# Patient Record
Sex: FEMALE | NOT HISPANIC OR LATINO | URBAN - METROPOLITAN AREA
[De-identification: names, ages, dates, MRNs, and addresses within clinical notes are randomized per-mention and may not be internally consistent; named-entity substitution may affect disease eponyms.]

---

## 2019-04-30 ENCOUNTER — OFFICE VISIT (OUTPATIENT)
Dept: FAMILY MEDICINE CLINIC | Facility: CLINIC | Age: 34
End: 2019-04-30

## 2019-04-30 VITALS
WEIGHT: 212.4 LBS | HEART RATE: 72 BPM | SYSTOLIC BLOOD PRESSURE: 110 MMHG | RESPIRATION RATE: 18 BRPM | HEIGHT: 65 IN | TEMPERATURE: 98.4 F | BODY MASS INDEX: 35.39 KG/M2 | OXYGEN SATURATION: 99 % | DIASTOLIC BLOOD PRESSURE: 78 MMHG

## 2019-04-30 DIAGNOSIS — G44.229 CHRONIC TENSION-TYPE HEADACHE, NOT INTRACTABLE: ICD-10-CM

## 2019-04-30 DIAGNOSIS — Z00.00 ANNUAL PHYSICAL EXAM: Primary | ICD-10-CM

## 2019-04-30 DIAGNOSIS — J02.9 SORE THROAT: ICD-10-CM

## 2019-04-30 DIAGNOSIS — Z31.9 PATIENT DESIRES PREGNANCY: ICD-10-CM

## 2019-04-30 PROCEDURE — 99385 PREV VISIT NEW AGE 18-39: CPT | Performed by: FAMILY MEDICINE

## 2019-04-30 RX ORDER — FLUTICASONE PROPIONATE 50 MCG
SPRAY, SUSPENSION (ML) NASAL
Refills: 0 | COMMUNITY
Start: 2019-03-11 | End: 2019-04-30 | Stop reason: ALTCHOICE

## 2019-04-30 NOTE — PROGRESS NOTES
Chief Complaint   Patient presents with   • Establish Care     NP to [facility]   • Annual Exam       Subjective   Jyotsna Randolph is a 34 y.o. female.     History of Present Illness   She has severe throat pain often. Feels she gets throat infections frequently. She says it is so painful. She gets abx for it. It is the worst in the winter. She has never had her tonsils removed. With the sore throat she gets fever and chills, she requires higher dose of tylenol for the pain.  She does have young child 6 yo but they are never sick when she is. No sore throats. She was seen 1/2018, 4/2018, 6/2018, 12/2018 and 3/2019.    She does follow with her gynecologist. Was last seen there 11/2018  She eats varied diet, no restrictions, she does not exercise regularly.   Father with diabetes otherwise no major medical conditions in her family.     Headaches  Has 2-3 times in a month. Hurts on the top central back of her head. She wants to sleep and has not interest in doing anything. It is throbbing headache. 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 It is relieved by rest and coffee. No nausea or vomiting associated with headache. No aura. She feels like it is stress comes from her job, feels it come up her neck to her head.     The following portions of the patient's history were reviewed and updated as appropriate: allergies, current medications, past family history, past medical history, past social history, past surgical history and problem list.    Review of Systems   Constitutional: Negative for activity change, appetite change and unexpected weight change.   Respiratory: Negative for shortness of breath.    Cardiovascular: Negative for chest pain and leg swelling.   Gastrointestinal: Negative for blood in stool, constipation and diarrhea.       Vitals:    04/30/19 0943   BP: 110/78   BP Location: Left arm   Patient Position: Sitting   Cuff Size: Adult   Pulse: 72   Resp: 18   Temp: 98.4 °F (36.9 °C)   TempSrc: Oral  "  SpO2: 99%   Weight: 96.3 kg (212 lb 6.4 oz)   Height: 165.1 cm (65\")       Objective   Physical Exam   Constitutional: She is oriented to person, place, and time. She appears well-nourished. No distress.   HENT:   Right Ear: External ear normal.   Left Ear: External ear normal.   Nose: Nose normal.   Mouth/Throat: Oropharynx is clear and moist. No oropharyngeal exudate.   Bilateral ear canals and tympanic membranes appear normal.   Eyes: Conjunctivae and EOM are normal. Right eye exhibits no discharge. Left eye exhibits no discharge. No scleral icterus.   Neck: Neck supple. No thyromegaly present.   Cardiovascular: Normal rate, regular rhythm, normal heart sounds and intact distal pulses. Exam reveals no gallop and no friction rub.   No murmur heard.  Pulmonary/Chest: Effort normal and breath sounds normal. No respiratory distress. She has no wheezes. She has no rales. She exhibits no tenderness.   Abdominal: Soft. Bowel sounds are normal. She exhibits no distension and no mass. There is no tenderness. There is no guarding.   Musculoskeletal: She exhibits no edema or deformity.   Lymphadenopathy:     She has no cervical adenopathy.   Neurological: She is alert and oriented to person, place, and time. She exhibits normal muscle tone. Coordination normal.   Skin: Skin is warm and dry.   Psychiatric: She has a normal mood and affect. Her behavior is normal.   Vitals reviewed.      Assessment/Plan   Jyotsna was seen today for establish care and annual exam.    Diagnoses and all orders for this visit:    Annual physical exam    Chronic tension-type headache, not intractable    Sore throat    Patient desires pregnancy    Other orders  -     budesonide (RA BUDESONIDE) 32 MCG/ACT nasal spray; 1 spray into the nostril(s) as directed by provider Daily.      We discussed the importance of regular physical activity.  Cardiovascular activity for the equivalent of 30 minutes 5 days per week.  We also discussed the importance of " healthy diet to avoid weight gain and sugar intolerance.  I recommend predominantly filling the diet with vegetables and lean meats followed by fruits and whole grains.  I also recommend overall avoiding processed foods.  Advised specifically to try yoga and walking for the stress and resultant headaches. Also these are good activities to continue during pregnancy.   She also is not good about hydrating with fluids during the day. Advised drinking more water for her sore throat and headaches. She should carry water bottle. She did go to the  frequently for sore throat. More likely related to allergies. Will change her to budesonide for pregnancy. Advised to take start of winter through start of spring. Instructed on how to administer. She voiced understanding.  Encouraged her to follow up with her OB. She started trying to have pregnancy this month. She should start PNV ASAP. Asking about immune boosting vitamin and encouraged her to take PNV.

## 2023-09-29 ENCOUNTER — HOSPITAL ENCOUNTER (OUTPATIENT)
Dept: GENERAL RADIOLOGY | Facility: HOSPITAL | Age: 38
Discharge: HOME | End: 2023-09-29
Admitting: NURSE PRACTITIONER
Payer: COMMERCIAL

## 2023-09-29 ENCOUNTER — OFFICE VISIT (OUTPATIENT)
Dept: INTERNAL MEDICINE | Facility: CLINIC | Age: 38
End: 2023-09-29
Payer: COMMERCIAL

## 2023-09-29 VITALS
HEART RATE: 61 BPM | SYSTOLIC BLOOD PRESSURE: 100 MMHG | DIASTOLIC BLOOD PRESSURE: 64 MMHG | HEIGHT: 65 IN | WEIGHT: 221.4 LBS | BODY MASS INDEX: 36.89 KG/M2 | TEMPERATURE: 97.8 F | OXYGEN SATURATION: 99 %

## 2023-09-29 DIAGNOSIS — Z00.00 ENCOUNTER FOR ANNUAL PHYSICAL EXAM: Primary | ICD-10-CM

## 2023-09-29 DIAGNOSIS — J06.9 UPPER RESPIRATORY TRACT INFECTION, UNSPECIFIED TYPE: ICD-10-CM

## 2023-09-29 DIAGNOSIS — E66.01 MORBID (SEVERE) OBESITY DUE TO EXCESS CALORIES: ICD-10-CM

## 2023-09-29 DIAGNOSIS — S39.92XD INJURY OF COCCYX, SUBSEQUENT ENCOUNTER: ICD-10-CM

## 2023-09-29 DIAGNOSIS — Z00.00 ENCOUNTER FOR MEDICAL EXAMINATION TO ESTABLISH CARE: ICD-10-CM

## 2023-09-29 DIAGNOSIS — R53.83 OTHER FATIGUE: ICD-10-CM

## 2023-09-29 PROCEDURE — 72220 X-RAY EXAM SACRUM TAILBONE: CPT

## 2023-09-29 RX ORDER — DIPHENOXYLATE HYDROCHLORIDE AND ATROPINE SULFATE 2.5; .025 MG/1; MG/1
1 TABLET ORAL DAILY
Qty: 30 TABLET | Refills: 12 | Status: SHIPPED | OUTPATIENT
Start: 2023-09-29

## 2023-09-29 RX ORDER — AMOXICILLIN AND CLAVULANATE POTASSIUM 875; 125 MG/1; MG/1
1 TABLET, FILM COATED ORAL 2 TIMES DAILY
Qty: 20 TABLET | Refills: 0 | Status: SHIPPED | OUTPATIENT
Start: 2023-09-29 | End: 2023-10-09

## 2023-09-29 NOTE — PROGRESS NOTES
Please contact patient and let her know that the x-ray of the sacrum and coccyx is normal.  Thank you, Darlene Woodard

## 2023-09-29 NOTE — PATIENT INSTRUCTIONS
Please stop at the pharmacy and  your prescriptions and take as directed.  If your symptoms get worse with any shortness of breath or high fever that does not come down with Tylenol or ibuprofen please go to the emergency room.

## 2023-09-29 NOTE — PROGRESS NOTES
"Chief Complaint  Establish care/annual physical exam/URI    Subjective        Jyotsna Randolph presents to [facility]  History of Present Illness    Patient is a pleasant 38-year-old female who is new to me and new to the practice.  Patient lives at home with her  and 2 sons 1 that is 3 and the other is elementary age.  She works from home as a .    Patient is also here with complaints of URI x 3 weeks.  Symptoms include cough/runny nose, fatigue.  Denies any fever or chills at this time.  Patient has been tested for COVID and flu at the [facility] both were negative and she was not treated.  Patient has been taking over-the-counter Mucinex at this time.  Symptoms ongoing she is concerned at this time due to cough and congestion.    History of tailbone (pain). Fall years ago. 2021/2022 (x 2).     Hx Hemorrhoids.    Reports normal bowel and bladder history and normal menstrual cycle.  Patient has not seen OB/GYN since her last birth in 2020.  She will contact their office for her annual Pap smear.      Patient was instructed to go to the pharmacy and  a tailbone cushion at this time with her recent history of injury.  I will also do an x-ray at this time and contact her with those results when they are finalized by the radiologist.    Objective   Vital Signs:  /64   Pulse 61   Temp 97.8 °F (36.6 °C)   Ht 165.1 cm (65\")   Wt 100 kg (221 lb 6.4 oz)   SpO2 99%   BMI 36.84 kg/m²   Estimated body mass index is 36.84 kg/m² as calculated from the following:    Height as of this encounter: 165.1 cm (65\").    Weight as of this encounter: 100 kg (221 lb 6.4 oz).       Class 2 Severe Obesity (BMI >=35 and <=39.9). Obesity-related health conditions include the following: none. Obesity is unchanged. BMI is is above average; BMI management plan is completed. We discussed portion control, increasing exercise, joining a fitness center or start home based " exercise program, Weight Watchers or other Commercial based weight reduction program, and an doris-based approach such as [employer] Pal or Lose It.      Physical Exam  Vitals and nursing note reviewed.   Constitutional:       Appearance: Normal appearance. She is obese.   HENT:      Head: Normocephalic.      Right Ear: Tympanic membrane, ear canal and external ear normal. There is no impacted cerumen.      Left Ear: Tympanic membrane, ear canal and external ear normal. There is no impacted cerumen.      Nose: Congestion present.      Comments: Cough with congestion x3 weeks     Mouth/Throat:      Mouth: Mucous membranes are moist.      Pharynx: No oropharyngeal exudate or posterior oropharyngeal erythema.   Eyes:      Pupils: Pupils are equal, round, and reactive to light.   Cardiovascular:      Rate and Rhythm: Normal rate and regular rhythm.      Pulses: Normal pulses.      Heart sounds: Normal heart sounds.      Comments: No peripheral edema noted  Pulmonary:      Effort: Pulmonary effort is normal. No respiratory distress.      Breath sounds: Normal breath sounds. No stridor. No wheezing, rhonchi or rales.      Comments: Denies shortness of breath  Chest:      Chest wall: No tenderness.   Musculoskeletal:         General: Tenderness and signs of injury present.      Comments: Complains of coccyx pain while sitting times years after tailbone injury   Skin:     General: Skin is warm.      Capillary Refill: Capillary refill takes less than 2 seconds.   Neurological:      Mental Status: She is alert and oriented to person, place, and time.   Psychiatric:         Behavior: Behavior normal.      Result Review :                   Assessment and Plan   Diagnoses and all orders for this visit:    1. Encounter for annual physical exam (Primary)  -     CBC & Differential  -     Vitamin D,25-Hydroxy  -     TSH Rfx On Abnormal To Free T4  -     Comprehensive Metabolic Panel  -     Hemoglobin A1c  -     Lipid Panel    2.  Encounter for medical examination to establish care  -     CBC & Differential  -     Vitamin D,25-Hydroxy  -     TSH Rfx On Abnormal To Free T4  -     Comprehensive Metabolic Panel  -     Hemoglobin A1c  -     Lipid Panel    3. Injury of coccyx, subsequent encounter  -     CBC & Differential  -     Vitamin D,25-Hydroxy  -     TSH Rfx On Abnormal To Free T4  -     Comprehensive Metabolic Panel  -     Hemoglobin A1c  -     Lipid Panel  -     XR Sacrum & Coccyx (In Office)    4. Upper respiratory tract infection, unspecified type  -     CBC & Differential  -     Vitamin D,25-Hydroxy  -     TSH Rfx On Abnormal To Free T4  -     Comprehensive Metabolic Panel  -     Hemoglobin A1c  -     Lipid Panel    5. Other fatigue  -     CBC & Differential  -     Vitamin D,25-Hydroxy  -     TSH Rfx On Abnormal To Free T4  -     Comprehensive Metabolic Panel  -     Hemoglobin A1c  -     Lipid Panel    6. Morbid (severe) obesity due to excess calories  Assessment & Plan:  Chronic/unstable  Patient's (Body mass index is 36.84 kg/m².) indicates that they are obese (BMI >30) with health conditions that include none . Weight is unchanged. BMI  is above average; BMI management plan is completed. We discussed portion control, increasing exercise, joining a fitness center or start home based exercise program, Weight Watchers or other Commercial based weight reduction program, and an doris-based approach such as [place] Pal or Lose It.  Patient will try to exercise 3 times per week at least walking regularly.  She will also eat green leafy veggies, increase protein and increase water intake.  We will recheck on next office visit.      Other orders  -     multivitamin tablet tablet; Take 1 tablet by mouth Daily.  Dispense: 30 tablet; Refill: 12  -     vitamin D3 (Vitamin D) 125 MCG (5000 UT) capsule capsule; Take 1 capsule by mouth Daily.  Dispense: 30 capsule; Refill: 3  -     amoxicillin-clavulanate (AUGMENTIN) 875-125 MG per tablet; Take 1  tablet by mouth 2 (Two) Times a Day for 10 days.  Dispense: 20 tablet; Refill: 0      Patient will stop at the pharmacy and  her prescriptions for her upper respiratory infection and take as directed.  I have also sent over a multivitamin and vitamin D3 for patient to take per patient request.  Patient will get her labs done and we will contact her with those results.  Patient will try to exercise regularly at least 3 times per week 30 minutes at a time and increase water intake.     I spent 35 minutes caring for Jyotsna on this date of service. This time includes time spent by me in the following activities:preparing for the visit, reviewing tests, obtaining and/or reviewing a separately obtained history, performing a medically appropriate examination and/or evaluation , counseling and educating the patient/family/caregiver, ordering medications, tests, or procedures, referring and communicating with other health care professionals , documenting information in the medical record, independently interpreting results and communicating that information with the patient/family/caregiver, and care coordination  Follow Up   Return in about 4 weeks (around 10/27/2023) for Recheck.  Patient was given instructions and counseling regarding her condition or for health maintenance advice. Please see specific information pulled into the AVS if appropriate.

## 2024-04-11 ENCOUNTER — TELEMEDICINE (OUTPATIENT)
Dept: FAMILY MEDICINE CLINIC | Facility: TELEHEALTH | Age: 39
End: 2024-04-11

## 2024-04-11 DIAGNOSIS — R51.9 ACUTE NONINTRACTABLE HEADACHE, UNSPECIFIED HEADACHE TYPE: Primary | ICD-10-CM

## 2024-04-11 PROCEDURE — 99213 OFFICE O/P EST LOW 20 MIN: CPT | Performed by: NURSE PRACTITIONER

## 2024-04-12 NOTE — PROGRESS NOTES
CLAUDIA Randolph is a 39 y.o. female  presents with complaint of right back of head pain as well as pain above right eye for the last 4 days. Symptoms started after cleaning the bathroom and may be related to the strong smell. Has sharp pains 8/10 and other times remains constant at a 6/10. Denies vision changes, fever, chills, sweats, runny nose, cough, congestion, weakness contact lens or use of glasses. Tylenol did improve symptoms but symptoms returned when waking up. Has history of headaches in back of head.     Review of Systems    No past medical history on file.    Family History   Problem Relation Age of Onset    No Known Problems Mother     Diabetes Father     No Known Problems Sister     No Known Problems Sister     Breast cancer Neg Hx     Colon cancer Neg Hx     Ovarian cancer Neg Hx        Social History     Socioeconomic History    Marital status:    Tobacco Use    Smoking status: Never    Smokeless tobacco: Never   Vaping Use    Vaping status: Never Used   Substance and Sexual Activity    Alcohol use: No    Drug use: No    Sexual activity: Yes     Partners: Male     Birth control/protection: Condom         There were no vitals taken for this visit.    PHYSICAL EXAM  Physical Exam   Constitutional: She appears well-developed and well-nourished.   HENT:   Head: Normocephalic.   Nose: Nose normal.   Neck: Neck normal appearance.  Pulmonary/Chest: Effort normal.   Neurological: She is alert.   Psychiatric: She has a normal mood and affect. Her speech is normal.       Diagnoses and all orders for this visit:    1. Acute nonintractable headache, unspecified headache type (Primary)      Take ibuprofen or tylenol as needed for headache. Increase fluids. Call PCP office in the morning for follow-up. If symptoms worsen, go to ER tonight. Pt verbalized understanding.     FOLLOW-UP  As discussed during visit with [facility], if symptoms worsen or fail to improve, follow-up with PCP/Urgent  Care/Emergency Department.    Patient verbalizes understanding of medications, instructions for treatment and follow-up.    Mary Brownariel Cage, APRN  04/11/2024  21:28 EDT    The use of a video visit has been reviewed with the patient and verbal informed consent has been obtained. Myself and Jyotsna Randolph participated in this visit. The patient is located in [place], [place], and I am located in [place], [place]. [employer] and [employer] Client were utilized.